# Patient Record
Sex: FEMALE | Race: WHITE | NOT HISPANIC OR LATINO | URBAN - METROPOLITAN AREA
[De-identification: names, ages, dates, MRNs, and addresses within clinical notes are randomized per-mention and may not be internally consistent; named-entity substitution may affect disease eponyms.]

---

## 2017-11-13 ENCOUNTER — HISTORICAL (OUTPATIENT)
Dept: SURGERY | Facility: HOSPITAL | Age: 42
End: 2017-11-13

## 2017-11-13 LAB — POC BETA-HCG (QUAL): NEGATIVE

## 2018-02-08 ENCOUNTER — HISTORICAL (OUTPATIENT)
Dept: ADMINISTRATIVE | Facility: HOSPITAL | Age: 43
End: 2018-02-08

## 2018-02-15 ENCOUNTER — HISTORICAL (OUTPATIENT)
Dept: RADIOLOGY | Facility: HOSPITAL | Age: 43
End: 2018-02-15

## 2021-05-02 ENCOUNTER — HISTORICAL (OUTPATIENT)
Dept: URGENT CARE | Facility: CLINIC | Age: 46
End: 2021-05-02

## 2021-10-18 ENCOUNTER — HISTORICAL (OUTPATIENT)
Dept: ADMINISTRATIVE | Facility: HOSPITAL | Age: 46
End: 2021-10-18

## 2022-04-07 ENCOUNTER — HISTORICAL (OUTPATIENT)
Dept: ADMINISTRATIVE | Facility: HOSPITAL | Age: 47
End: 2022-04-07

## 2022-04-15 ENCOUNTER — HISTORICAL (OUTPATIENT)
Dept: ADMINISTRATIVE | Facility: HOSPITAL | Age: 47
End: 2022-04-15

## 2022-04-23 VITALS
WEIGHT: 125 LBS | OXYGEN SATURATION: 98 % | HEIGHT: 63 IN | BODY MASS INDEX: 22.15 KG/M2 | SYSTOLIC BLOOD PRESSURE: 124 MMHG | DIASTOLIC BLOOD PRESSURE: 86 MMHG

## 2022-04-28 NOTE — OP NOTE
DATE OF SURGERY:    11/13/2017    SURGEON:  Kp Escobedo MD    PREOPERATIVE DIAGNOSIS:  Family history of colon cancer.    POSTOPERATIVE DIAGNOSIS:  Normal colon.    PROCEDURE:  High risk screening colonoscopy to cecum.     Anesthesia:  Propofol.     Estimated blood loss:  None.   Specimen:  None.   Complications:  None.    PROCEDURE IN DETAIL:  After informed consent was obtained, the patient was brought to the operating room.  Continuous EKG, pulse oximetry, and intermittent blood pressure monitoring was utilized.  The patient was placed in the left lateral decubitus position and Propofol was administered by a member of the anesthesia team.  A digital rectal exam was performed and no mass or gross blood was identified.  Colonoscope was inserted into the rectum, insufflated and gently advanced in a retrograde fashion to the level of the cecum.  Ileocecal valve and appendiceal orifice were identified.  Photos were taken.  Colonoscope was slowly retracted and the colonic mucosa examined in a 360 degree fashion.  No masses, polyps or other mucosal abnormalities were noted.  Colonoscope was retracted back into the rectum while decompressing the colon.  It was retroflexed and mildly enlarged internal hemorrhoids were seen.  A photo was taken.  Colonoscope was returned to the neutral position and completely removed from the patient.  She tolerated the procedure well and there were no complications.  Subsequently She was transferred to recovery in satisfactory condition.    PLAN:    Due to patient's family history of colon cancer, recommend repeat colonoscopy in five years.        ______________________________  MD MAXIMILIANO Mulligan/AVI  DD:  11/13/2017  Time:  11:02AM  DT:  11/13/2017  Time:  02:24PM  Job #:  [id number]

## 2022-04-28 NOTE — OP NOTE
DATE OF SURGERY:    10/18/2021    SURGEON:  Kp Escobedo MD    PREOPERATIVE DIAGNOSIS:  Family history of colon cancer.    POSTOPERATIVE DIAGNOSIS:  Normal colon.    PROCEDURE:  High-risk screening colonoscopy to the cecum.    ANESTHESIA:  Propofol.    ESTIMATED BLOOD LOSS:  None.    SPECIMENS:  None.    COMPLICATIONS:  None.    PROCEDURE IN DETAIL:  After informed consent was obtained, the patient was brought to the operating room.  Continuous EKG, pulse oximetry, and intermittent blood pressure monitoring were utilized.  The patient was placed in the left lateral decubitus position, and propofol was administered by a member of the anesthesia team.  A digital rectal exam was performed, and no mass or gross blood was identified.  Colonoscope was inserted into the rectum, insufflated, and gently advanced in a retrograde fashion to the level of the cecum.  The ileocecal valve and appendiceal orifice were identified.  Photos were taken.  Colonoscope was slowly retracted and the colonic mucosa examined in a 360-degree fashion.  No masses, polyps, or other mucosal abnormalities were noted.  Colonoscope was retracted into the rectum while decompressing the colon.  Colonoscope was retroflexed, and a mildly enlarged internal hemorrhoid was seen.  A photo was taken.  Colonoscope was returned to the neutral position and completely removed from the patient.  She tolerated the procedure well.  There were no complications.  Subsequently, she was transferred to recovery in satisfactory condition.      PLAN:  Due to patient's family history of colon cancer, recommend repeat colonoscopy in 5 years.      ______________________________  MD MAXIMILIANO Mulligan/  DD:  10/18/2021  Time:  10:45AM  DT:  10/18/2021  Time:  11:01AM  Job #:  [id number]

## 2023-03-20 PROBLEM — R10.2 PELVIC PAIN: Status: ACTIVE | Noted: 2023-03-20

## 2023-03-20 PROBLEM — Z01.419 WELL WOMAN EXAM: Status: ACTIVE | Noted: 2023-03-20
